# Patient Record
Sex: MALE | URBAN - METROPOLITAN AREA
[De-identification: names, ages, dates, MRNs, and addresses within clinical notes are randomized per-mention and may not be internally consistent; named-entity substitution may affect disease eponyms.]

---

## 2021-01-12 ENCOUNTER — OFFICE VISIT (OUTPATIENT)
Dept: URBAN - METROPOLITAN AREA CLINIC 98 | Facility: CLINIC | Age: 61
End: 2021-01-12

## 2021-01-13 ENCOUNTER — OFFICE VISIT (OUTPATIENT)
Dept: URBAN - METROPOLITAN AREA CLINIC 98 | Facility: CLINIC | Age: 61
End: 2021-01-13
Payer: COMMERCIAL

## 2021-01-13 ENCOUNTER — DASHBOARD ENCOUNTERS (OUTPATIENT)
Age: 61
End: 2021-01-13

## 2021-01-13 DIAGNOSIS — R14.0 ABDOMINAL BLOATING: ICD-10-CM

## 2021-01-13 DIAGNOSIS — K21.9 GERD (GASTROESOPHAGEAL REFLUX DISEASE): ICD-10-CM

## 2021-01-13 PROBLEM — 698065002 ACID REFLUX: Status: ACTIVE | Noted: 2021-01-13

## 2021-01-13 PROCEDURE — 99203 OFFICE O/P NEW LOW 30 MIN: CPT | Performed by: INTERNAL MEDICINE

## 2021-01-13 PROCEDURE — 99243 OFF/OP CNSLTJ NEW/EST LOW 30: CPT | Performed by: INTERNAL MEDICINE

## 2021-01-13 PROCEDURE — G8484 FLU IMMUNIZE NO ADMIN: HCPCS | Performed by: INTERNAL MEDICINE

## 2021-01-13 RX ORDER — FINASTERIDE 5 MG/1
1 TABLET TABLET, FILM COATED ORAL ONCE A DAY
Status: ACTIVE | COMMUNITY

## 2021-01-13 RX ORDER — OMEPRAZOLE 40 MG/1
1 CAPSULE 30 MINUTES BEFORE MORNING MEAL CAPSULE, DELAYED RELEASE ORAL ONCE A DAY
Status: ACTIVE | COMMUNITY

## 2021-01-13 RX ORDER — TAMSULOSIN HYDROCHLORIDE 0.4 MG/1
1 CAPSULE CAPSULE ORAL ONCE A DAY
Status: ACTIVE | COMMUNITY

## 2021-01-13 RX ORDER — ROSUVASTATIN CALCIUM 40 MG/1
1 TABLET TABLET, FILM COATED ORAL ONCE A DAY
Status: ACTIVE | COMMUNITY

## 2021-01-13 RX ORDER — ZINC 25 MG
1 TABLET TABLET ORAL ONCE A DAY
Status: ACTIVE | COMMUNITY

## 2021-01-13 RX ORDER — ALLOPURINOL 100 MG/1
1 TABLET TABLET ORAL ONCE A DAY
Status: ACTIVE | COMMUNITY

## 2021-01-13 NOTE — HPI-TODAY'S VISIT:
59 yo pt referred by Dr. Dori Luo for evaluation JOSE and dyspepsia.  Patient has been c/o p-prandial bloating, gas, upper abdfominal distention and borborygmi for quite some time. No diarrhea, although occasional loose, non-bloody stools are reproted. No anorexia or weight loss. No constitutional sxs. Has been changing his diet lately. He had an EGD 6/19 in [place], [place]: Hp-negative gastritis and NERD. Colonoscopy 6/19: rectal polyp.  Currently he's on Omeprazole qd; w/o change in above staetd sxs. No other complaints.

## 2021-01-14 LAB
A/G RATIO: 1.4
ALBUMIN: 4.4
ALKALINE PHOSPHATASE: 82
ALT (SGPT): 26
AST (SGOT): 32
BILIRUBIN, TOTAL: 0.4
BUN/CREATININE RATIO: 13
BUN: 12
C-REACTIVE PROTEIN, QUANT: <1
CALCIUM: 9.7
CARBON DIOXIDE, TOTAL: 25
CHLORIDE: 102
CREATININE: 0.94
EGFR IF AFRICN AM: 103
EGFR IF NONAFRICN AM: 89
GLOBULIN, TOTAL: 3.2
GLUCOSE: 114
HEMATOCRIT: 43.1
HEMOGLOBIN: 15.1
IRON BIND.CAP.(TIBC): 321
IRON SATURATION: 24
IRON: 77
LIPASE: 53
MCH: 30.6
MCHC: 35
MCV: 87
NRBC: (no result)
PLATELETS: 290
POTASSIUM: 4.8
PROTEIN, TOTAL: 7.6
RBC: 4.94
RDW: 13.4
SODIUM: 143
T4,FREE(DIRECT): 1.25
TSH: 1.6
UIBC: 244
VITAMIN B12: 1484
WBC: 7.1

## 2021-01-15 PROBLEM — 235595009 GASTROESOPHAGEAL REFLUX DISEASE: Status: ACTIVE | Noted: 2021-01-13

## 2021-05-25 ENCOUNTER — LAB OUTSIDE AN ENCOUNTER (OUTPATIENT)
Dept: URBAN - METROPOLITAN AREA CLINIC 98 | Facility: CLINIC | Age: 61
End: 2021-05-25

## 2021-06-02 LAB
CALPROTECTIN, FECAL: 100
PANCREATIC ELASTASE, FECAL: >500